# Patient Record
Sex: FEMALE | Race: WHITE | Employment: OTHER | URBAN - METROPOLITAN AREA
[De-identification: names, ages, dates, MRNs, and addresses within clinical notes are randomized per-mention and may not be internally consistent; named-entity substitution may affect disease eponyms.]

---

## 2019-08-30 ENCOUNTER — HOSPITAL ENCOUNTER (OUTPATIENT)
Dept: LAB | Age: 59
Discharge: HOME | End: 2019-08-30

## 2019-08-30 PROCEDURE — 88305 TISSUE EXAM BY PATHOLOGIST: CPT

## 2019-09-12 ENCOUNTER — ANESTHESIA EVENT (OUTPATIENT)
Dept: ENDOSCOPY | Age: 59
End: 2019-09-12
Payer: OTHER GOVERNMENT

## 2019-09-12 RX ORDER — SODIUM CHLORIDE 0.9 % (FLUSH) 0.9 %
5-40 SYRINGE (ML) INJECTION EVERY 8 HOURS
Status: CANCELLED | OUTPATIENT
Start: 2019-09-12

## 2019-09-12 RX ORDER — SODIUM CHLORIDE, SODIUM LACTATE, POTASSIUM CHLORIDE, CALCIUM CHLORIDE 600; 310; 30; 20 MG/100ML; MG/100ML; MG/100ML; MG/100ML
100 INJECTION, SOLUTION INTRAVENOUS CONTINUOUS
Status: CANCELLED | OUTPATIENT
Start: 2019-09-12

## 2019-09-12 RX ORDER — SODIUM CHLORIDE 0.9 % (FLUSH) 0.9 %
5-40 SYRINGE (ML) INJECTION AS NEEDED
Status: CANCELLED | OUTPATIENT
Start: 2019-09-12

## 2019-09-13 ENCOUNTER — HOSPITAL ENCOUNTER (OUTPATIENT)
Age: 59
Setting detail: OUTPATIENT SURGERY
Discharge: HOME | End: 2019-09-13
Attending: INTERNAL MEDICINE | Admitting: INTERNAL MEDICINE
Payer: OTHER GOVERNMENT

## 2019-09-13 ENCOUNTER — ANESTHESIA (OUTPATIENT)
Dept: ENDOSCOPY | Age: 59
End: 2019-09-13
Payer: OTHER GOVERNMENT

## 2019-09-13 VITALS
DIASTOLIC BLOOD PRESSURE: 59 MMHG | TEMPERATURE: 98.6 F | SYSTOLIC BLOOD PRESSURE: 113 MMHG | OXYGEN SATURATION: 100 % | RESPIRATION RATE: 18 BRPM | HEART RATE: 72 BPM

## 2019-09-13 PROCEDURE — 76040000025: Performed by: INTERNAL MEDICINE

## 2019-09-13 PROCEDURE — 77030021593 HC FCPS BIOP ENDOSC BSC -A: Performed by: INTERNAL MEDICINE

## 2019-09-13 PROCEDURE — 74011000250 HC RX REV CODE- 250

## 2019-09-13 PROCEDURE — 76060000031 HC ANESTHESIA FIRST 0.5 HR: Performed by: INTERNAL MEDICINE

## 2019-09-13 PROCEDURE — 88305 TISSUE EXAM BY PATHOLOGIST: CPT

## 2019-09-13 PROCEDURE — 74011250636 HC RX REV CODE- 250/636: Performed by: ANESTHESIOLOGY

## 2019-09-13 PROCEDURE — 74011250636 HC RX REV CODE- 250/636

## 2019-09-13 PROCEDURE — 77030013991 HC SNR POLYP ENDOSC BSC -A: Performed by: INTERNAL MEDICINE

## 2019-09-13 RX ORDER — GUAIFENESIN 100 MG/5ML
81 LIQUID (ML) ORAL DAILY
COMMUNITY

## 2019-09-13 RX ORDER — PROPOFOL 10 MG/ML
INJECTION, EMULSION INTRAVENOUS AS NEEDED
Status: DISCONTINUED | OUTPATIENT
Start: 2019-09-13 | End: 2019-09-13 | Stop reason: HOSPADM

## 2019-09-13 RX ORDER — SODIUM CHLORIDE, SODIUM LACTATE, POTASSIUM CHLORIDE, CALCIUM CHLORIDE 600; 310; 30; 20 MG/100ML; MG/100ML; MG/100ML; MG/100ML
100 INJECTION, SOLUTION INTRAVENOUS CONTINUOUS
Status: DISCONTINUED | OUTPATIENT
Start: 2019-09-13 | End: 2019-09-13 | Stop reason: HOSPADM

## 2019-09-13 RX ORDER — MONTELUKAST SODIUM 10 MG/1
10 TABLET ORAL DAILY
COMMUNITY

## 2019-09-13 RX ORDER — LIDOCAINE HYDROCHLORIDE 20 MG/ML
INJECTION, SOLUTION EPIDURAL; INFILTRATION; INTRACAUDAL; PERINEURAL AS NEEDED
Status: DISCONTINUED | OUTPATIENT
Start: 2019-09-13 | End: 2019-09-13 | Stop reason: HOSPADM

## 2019-09-13 RX ADMIN — SODIUM CHLORIDE, SODIUM LACTATE, POTASSIUM CHLORIDE, AND CALCIUM CHLORIDE 100 ML/HR: 600; 310; 30; 20 INJECTION, SOLUTION INTRAVENOUS at 13:06

## 2019-09-13 RX ADMIN — PROPOFOL 80 MG: 10 INJECTION, EMULSION INTRAVENOUS at 13:19

## 2019-09-13 RX ADMIN — PROPOFOL 40 MG: 10 INJECTION, EMULSION INTRAVENOUS at 13:32

## 2019-09-13 RX ADMIN — PROPOFOL 50 MG: 10 INJECTION, EMULSION INTRAVENOUS at 13:20

## 2019-09-13 RX ADMIN — PROPOFOL 40 MG: 10 INJECTION, EMULSION INTRAVENOUS at 13:22

## 2019-09-13 RX ADMIN — PROPOFOL 40 MG: 10 INJECTION, EMULSION INTRAVENOUS at 13:24

## 2019-09-13 RX ADMIN — PROPOFOL 20 MG: 10 INJECTION, EMULSION INTRAVENOUS at 13:25

## 2019-09-13 RX ADMIN — LIDOCAINE HYDROCHLORIDE 50 MG: 20 INJECTION, SOLUTION EPIDURAL; INFILTRATION; INTRACAUDAL; PERINEURAL at 13:19

## 2019-09-13 RX ADMIN — PROPOFOL 40 MG: 10 INJECTION, EMULSION INTRAVENOUS at 13:26

## 2019-09-13 RX ADMIN — PROPOFOL 40 MG: 10 INJECTION, EMULSION INTRAVENOUS at 13:29

## 2019-09-13 RX ADMIN — PROPOFOL 50 MG: 10 INJECTION, EMULSION INTRAVENOUS at 13:34

## 2019-09-13 NOTE — ROUTINE PROCESS
Pt. Discharged to car by Paula Ravi with family . Vital signs stable. Able to tolerate PO fluids. Passing gas.  Seen by MD.

## 2019-09-13 NOTE — PROGRESS NOTES
20 gauge IV removed from left wrist and covered with a clean 2x2 and coban. Tip intact. NO hematoma and no bleeding.

## 2019-09-13 NOTE — PROCEDURES
ESOPHAGOGASTRODUODENOSCOPY    DATE of PROCEDURE: 9/13/2019    PT NAME: Navdeep Doyle     [id number]    MEDICATION:   MAC        INSTRUMENT:  VF    SPECIAL PROCEDURE: snare; bx; photo  BLOOD LOSS- 0 or min. SPEC- yes  IMPLANT- none    PROCEDURE:  Standard EGD      ASSESSMENT:  1. 1 cm polyp just distal to pylorus- piecemeal snare  2. Adjacent flatter polyp - bx  3. 2 smaller polyps bx'd     Above accomplished with side viewing scope - photos obtained      PLAN:   1. Check path  2.  F/U by phone    Byron Kapadia MD

## 2019-09-13 NOTE — H&P
>      Patient:   Jorge Real  YOB: 1960   Date:                            19          This 60year old  patient was referred by Jaycee Mortimer MD.  This 60year old female presents for weight loss and loss of appetite. Old hx  History of Present Illness:  1.  weight loss   2.  loss of appetite   Ms. Eh Gallo is a 60year old female, new patient referred by Dr. Jay Griffith, for evaluation of weight loss. Labs 19: normal CBC and Hgb A1c 5.9. CT 19: no acute abnormality or inflammatory process, and no cause for weight loss. She complains of no appetite and has lost 30lbs over the past 3 months. She is able to eat but has no appetite. She denies any abdominal pain, nausea, or dysphagia. She has had a prior episode of loss of appetite but weight was stable during that time. Her average weight is ~160lbs. She has normal bowel habits. She follows gluten and dairy free diet. She used to have diarrhea but improved with avoiding gluten about 8 years ago. She was not found to have Celiac on testing. About 6 months ago she had a chest scan done that revealed a single spot, but had repeat scan that was normal. She denies any alcohol use. New hx    Had adenoma on EGD      Past Medical/Surgical History:   (Detailed)  Disease/disorder Onset Date Management Date Comments   coronary artery calcifications    BPV 2019 -   Hashimoto's thyroiditis    BPV 2019 -   Lupus    BPV 2019 -   osteoarthritis    BPV 2019 -   thyromegaly    V 2019 -   uterine fibroids    [facility] 2019 -     Cholecystectomy 2005      Tonsillectomy 1966    Asthma       Depression       Fibromyalgia         Procedure History  Colonoscopy ~- no polyps.     Family History:  (Detailed)  Relationship Family Member Name  Age at Death Condition Onset Age Cause of Death       No family history of Cancer, colon  N   Father    Cancer, lung  N       Social History: (Detailed)  Retired. Tobacco use reviewed. Preferred language is [employer]. MARITAL STATUS/FAMILY/SOCIAL SUPPORT  Currently . CHILDREN  Has children:  Smoking status: Former smoker. SMOKING STATUS  Type Smoking Status Usage Per Day Years Used Total Pack Years    Former smoker        ALCOHOL  There is no history of alcohol use. CAFFEINE  The patient uses caffeine: soda - 1 cup a day. COMMENTS  Pt has piercings. No hx of blood transfusions, tattoos, IVDA, or Hep A/B vaccinations. Current Medications:  Medication Name Sig Desc   aspirin 81 mg tablet,delayed release take 1 tablet by oral route  every day   albuterol sulfate HFA 90 mcg/actuation aerosol inhaler inhale 2 puff by inhalation route  every 4 - 6 hours as needed   modafinil 200 mg tablet take 1 tablet by oral route 2 times every day in the morning   Pristiq 100 mg tablet,extended release take 1 tablet by oral route  every day   Vitamin D2 50,000 unit capsule take 1 capsule twice a week   pravastatin 20 mg tablet take 1 Tablet by oral route  every day     Allergies:  Ingredient Reaction (Severity) Medication Name Comment   GLUTEN   intolerant. Diarrhea   LATEX   latex around mouth, asthma issues. on skin, rash,   MILK   diarrhea   PENICILLIN   emesis   SULFA (SULFONAMIDE ANTIBIOTICS)      Reviewed, updated. Review of Systems:  System Neg/Pos Details   Constitutional Positive Weight loss. Endocrine Positive Cold intolerance. Psych Positive Depression. All other review of systems are negative. Vital Signs:  BP mm/Hg Pulse Resp Pulse Ox Temp F Ht (Total in.) Weight (lbs.) Weight (oz.) BMI   122/58 92 16   64.00 139.20  23.89     Physical Exam:  General- well developed, well nourished, appears stated age  Psychiatric- Patient is alert and oriented x3. Memory is intact.   HEENT-no thyromegaly, or adenopathy  Lymphatics - Cervical and axillary areas free of lymphadenopathy  Lungs-clear to P&A  Cardiac-regular rhythm without murmur  Abdomen-no organomegaly or mass, normoactive bowel sounds, and nontender. No bruit. Rectal-deferred  Extremities-free range of motion and no edema  Skin- Warm and dry, without telangiectasia or palmar erythema. Labs/X-Rays:  -Labs 6/14/19: normal CBC and Hgb A1c 5.9.   -CT 6/27/19: no acute abnormality or inflammatory process, and no cause for weight loss. Assessment/Plan:  # Detail Type Description     Duodenal polyp  Needs egd and resection    PT SEEN AND EXAMINED AND PLAN DISCUSSED AND IMPLEMENTED.   Emily Robles MD

## 2019-09-13 NOTE — ANESTHESIA PREPROCEDURE EVALUATION
Relevant Problems   No relevant active problems       Anesthetic History   No history of anesthetic complications            Review of Systems / Medical History  Patient summary reviewed and pertinent labs reviewed    Pulmonary            Asthma : well controlled       Neuro/Psych         Psychiatric history     Cardiovascular              Hyperlipidemia    Exercise tolerance: >4 METS     GI/Hepatic/Renal  Within defined limits              Endo/Other             Other Findings   Comments: Fibromyalgia  H/o lupus         Physical Exam    Airway  Mallampati: III  TM Distance: 4 - 6 cm  Neck ROM: normal range of motion   Mouth opening: Normal     Cardiovascular    Rhythm: regular  Rate: normal         Dental         Pulmonary  Breath sounds clear to auscultation               Abdominal         Other Findings            Anesthetic Plan    ASA: 2  Anesthesia type: total IV anesthesia          Induction: Intravenous  Anesthetic plan and risks discussed with: Patient

## 2019-09-13 NOTE — DISCHARGE INSTRUCTIONS
Gastrointestinal Esophagogastroduodenoscopy (EGD) - Upper Exam Discharge Instructions    1. Call Dr. Jerrod Zhao at [phone] for any problems or questions. 2. Contact the doctor's office for follow up appointment as directed. 3. Medication may cause drowsiness for several hours, therefore:  · Do not drive or operate machinery for remainder of the day. · No alcohol today. · Do not make any important or legal decisions for 24 hours. · Do not sign any legal documents for 24 hours. 5. Ordinarily, you may resume regular diet and activity after exam unless otherwise specified by your physician. 6. For mild soreness in your throat you may use throat lozenges or warm salt-water gargles as needed. Any additional instructions: Follow up as needed     Instructions given to Navdeep Doyle and other family members.

## 2019-09-16 NOTE — ANESTHESIA POSTPROCEDURE EVALUATION
Procedure(s):  ESOPHAGOGASTRODUODENOSCOPY (EGD) WITH SIDE VIEW SCOPE BMI 24  ENDOSCOPIC POLYPECTOMY  ESOPHAGOGASTRODUODENAL (EGD) BIOPSY.     total IV anesthesia    Anesthesia Post Evaluation      Multimodal analgesia: multimodal analgesia used between 6 hours prior to anesthesia start to PACU discharge  Patient location during evaluation: PACU  Patient participation: complete - patient participated  Level of consciousness: awake and alert  Pain management: adequate  Airway patency: patent  Anesthetic complications: no  Cardiovascular status: acceptable and hemodynamically stable  Respiratory status: acceptable  Hydration status: acceptable        Vitals Value Taken Time   /59 9/13/2019  2:16 PM   Temp 37 °C (98.6 °F) 9/13/2019  1:44 PM   Pulse 72 9/13/2019  2:16 PM   Resp 18 9/13/2019  2:16 PM   SpO2 100 % 9/13/2019  2:16 PM

## (undated) DEVICE — SNARE POLYP SM W13MMXL240CM SHTH DIA2.4MM OVL FLX DISP

## (undated) DEVICE — FORCEPS BX L240CM JAW DIA2.8MM L CAP W/ NDL MIC MESH TOOTH

## (undated) DEVICE — CONTAINER PREFIL FRMLN 40ML --

## (undated) DEVICE — KENDALL RADIOLUCENT FOAM MONITORING ELECTRODE RECTANGULAR SHAPE: Brand: KENDALL

## (undated) DEVICE — REM POLYHESIVE ADULT PATIENT RETURN ELECTRODE: Brand: VALLEYLAB

## (undated) DEVICE — CANNULA NSL ORAL AD FOR CAPNOFLEX CO2 O2 AIRLFE

## (undated) DEVICE — SYR 50ML SLIP TIP NSAF LF STRL --

## (undated) DEVICE — CONNECTOR TBNG OD5-7MM O2 END DISP

## (undated) DEVICE — BLOCK BITE AD 60FR W/ VELC STRP ADDRESSES MOST PT AND